# Patient Record
Sex: MALE | Race: WHITE | Employment: STUDENT | URBAN - METROPOLITAN AREA
[De-identification: names, ages, dates, MRNs, and addresses within clinical notes are randomized per-mention and may not be internally consistent; named-entity substitution may affect disease eponyms.]

---

## 2019-04-15 ENCOUNTER — HOSPITAL ENCOUNTER (OUTPATIENT)
Age: 24
Discharge: HOME | End: 2019-04-15
Attending: FAMILY MEDICINE
Payer: COMMERCIAL

## 2019-04-15 VITALS
HEART RATE: 82 BPM | OXYGEN SATURATION: 99 % | SYSTOLIC BLOOD PRESSURE: 116 MMHG | RESPIRATION RATE: 18 BRPM | TEMPERATURE: 98 F | DIASTOLIC BLOOD PRESSURE: 60 MMHG

## 2019-04-15 DIAGNOSIS — R11.2 NAUSEA VOMITING AND DIARRHEA: Primary | ICD-10-CM

## 2019-04-15 DIAGNOSIS — R19.7 NAUSEA VOMITING AND DIARRHEA: Primary | ICD-10-CM

## 2019-04-15 PROCEDURE — 99204 OFFICE O/P NEW MOD 45 MIN: CPT

## 2019-04-15 PROCEDURE — 85025 COMPLETE CBC W/AUTO DIFF WBC: CPT | Performed by: FAMILY MEDICINE

## 2019-04-15 PROCEDURE — 80047 BASIC METABLC PNL IONIZED CA: CPT

## 2019-04-15 PROCEDURE — 96360 HYDRATION IV INFUSION INIT: CPT

## 2019-04-15 PROCEDURE — 81002 URINALYSIS NONAUTO W/O SCOPE: CPT | Performed by: FAMILY MEDICINE

## 2019-04-15 RX ORDER — SODIUM CHLORIDE 9 MG/ML
1000 INJECTION, SOLUTION INTRAVENOUS ONCE
Status: COMPLETED | OUTPATIENT
Start: 2019-04-15 | End: 2019-04-15

## 2019-04-15 NOTE — ED PROVIDER NOTES
Patient Seen in: [facility]    History   Patient presents with:  Diarrhea    Stated Complaint: stomach issues    HPI    This 22-year-old male presents the office with complaint of nausea, vomiting and diarrhea which started 5 da Resp 18   SpO2 99%         Physical Exam    General: mildly dehydrated/WN/WD, in NAD, A and O times 3  HEAD: Normocephalic, atraumatic  EYES:  Sclera anicteric,  conjunctiva normal.  EARS: Tympanic membranes normal, EAC's normal.  NOSE: Turbinates normal, noodle soup, plain noodles or baked potatoes with nothing on them. Avoid any spicy, greasy, fatty foods. You may use Imodium as needed for diarrhea as long as there is no blood in the stools.   Go to the emergency room if you have worsening abdominal pain

## 2019-04-15 NOTE — ED NOTES
Patient resting comfortably on cart, on cell phone. Offers no needs or complaints at this time. IVF infusing w/o difficulty.

## 2019-04-15 NOTE — ED INITIAL ASSESSMENT (HPI)
C/O N/V that started Thursday morning then that subsided and sts that he started to have diarrhea. Is feeling better, but is still having diarrhea several times per day. Has been eating steak and pork last few days and had diarrhea as a result.  Denies any

## (undated) NOTE — LETTER
[facility] IN [place]  [address] [id number]  Dept: [phone]  Dept Fax: [phone]         April 15, 2019    Patient: Gardenia Ritchie   YOB: 1995   Date of Visit: 4/15/2019       To Whom It May Concern: